# Patient Record
(demographics unavailable — no encounter records)

---

## 2024-11-16 NOTE — PHYSICAL EXAM

## 2024-11-16 NOTE — DEVELOPMENTAL MILESTONES
[Normal Development] : Normal Development [None] : none [Plays alongside other children] : plays alongside other children [Takes off some clothing] : takes off some clothing [Scoops well with spoon] : scoops well with spoon [Uses 50 words] : uses 50 words [Combine 2 words into phrase or] : combines 2 words into phrase or sentences [Follows 2-step command] : follows 2-step command [Uses words that are 50% intelligible] : uses words that are 50% intelligible to strangers [Kicks ball] : kicks ball  [Runs with coordination] : runs with coordination [Climbs up a ladder at a] : climbs up a ladder at a playground [Stacks objects] : stacks objects [Turns book pages] : turns book pages [Passed] : passed [Yes] : Completed.

## 2024-11-16 NOTE — HISTORY OF PRESENT ILLNESS
[Parents] : parents [Vegetables] : vegetables [Cow's milk (Ounces per day ___)] : consumes [unfilled] oz of Cow's milk per day [Fruit] : fruit [Meat] : meat [Cereal] : cereal [Eggs] : eggs [Finger Foods] : finger foods [Table food] : table food [Dairy] : dairy [___ stools per day] : [unfilled]  stools per day [Firm] : stools are firm consistency [Normal] : Normal [In bed] : In bed [Brushing teeth] : Brushing teeth [Sippy cup use] : Sippy cup use [Yes] : Patient goes to dentist yearly [Toothpaste] : Primary Fluoride Source: Toothpaste [In nursery school] : In nursery school [Playtime 60 min a day] : Playtime 60 min a day [Toilet Training] : Toilet training [<2 hrs of screen time] : Less than 2 hrs of screen time [No] : Not at  exposure [Water heater temperature set at <120 degrees F] : Water heater temperature set at <120 degrees F [Car seat in back seat] : Car seat in back seat [Carbon Monoxide Detectors] : Carbon monoxide detectors [Exposure to electronic nicotine delivery system] : No exposure to electronic nicotine delivery system [At risk for exposure to TB] : Not at risk for exposure to Tuberculosis [Up to date] : Up to date [FreeTextEntry7] : 2 year old for her check up [de-identified] : doing very well  [NO] : No

## 2024-11-16 NOTE — PHYSICAL EXAM

## 2024-11-16 NOTE — DISCUSSION/SUMMARY
[Normal Growth] : growth [Normal Development] : development [None] : No known medical problems [No Elimination Concerns] : elimination [No Feeding Concerns] : feeding [No Skin Concerns] : skin [Normal Sleep Pattern] : sleep [Assessment of Language Development] : assessment of language development [Temperament and Behavior] : temperament and behavior [Toilet Training] : toilet training [TV Viewing] : tv viewing [Safety] : safety [No Medications] : ~He/She~ is not on any medications [Parent/Guardian] : parent/guardian [] : The components of the vaccine(s) to be administered today are listed in the plan of care. The disease(s) for which the vaccine(s) are intended to prevent and the risks have been discussed with the caretaker.  The risks are also included in the appropriate vaccination information statements which have been provided to the patient's caregiver.  The caregiver has given consent to vaccinate. [FreeTextEntry1] : Continue cow's milk. Continue table foods, 3 meals with 2-3 snacks per day. Incorporate flourinated water daily in a sippy cup. Brush teeth twice a day with soft toothbrush. Recommend visit to dentist. As per seat 's guidelines, use foward-facing car seat in back seat of car. Put toddler to sleep in own bed. Help toddler to maintain consistent daily routines and sleep schedule. Toilet training discussed. Ensure home is safe. Use consistent, positive discipline. Read aloud to toddler. Limit screen time to no more than 2 hours per day. follow up in 6 months

## 2024-11-16 NOTE — HISTORY OF PRESENT ILLNESS
[Parents] : parents [Vegetables] : vegetables [Cow's milk (Ounces per day ___)] : consumes [unfilled] oz of Cow's milk per day [Fruit] : fruit [Meat] : meat [Cereal] : cereal [Eggs] : eggs [Finger Foods] : finger foods [Table food] : table food [Dairy] : dairy [___ stools per day] : [unfilled]  stools per day [Firm] : stools are firm consistency [Normal] : Normal [In bed] : In bed [Brushing teeth] : Brushing teeth [Sippy cup use] : Sippy cup use [Yes] : Patient goes to dentist yearly [Toothpaste] : Primary Fluoride Source: Toothpaste [In nursery school] : In nursery school [Playtime 60 min a day] : Playtime 60 min a day [Toilet Training] : Toilet training [<2 hrs of screen time] : Less than 2 hrs of screen time [No] : Not at  exposure [Water heater temperature set at <120 degrees F] : Water heater temperature set at <120 degrees F [Car seat in back seat] : Car seat in back seat [Carbon Monoxide Detectors] : Carbon monoxide detectors [Exposure to electronic nicotine delivery system] : No exposure to electronic nicotine delivery system [At risk for exposure to TB] : Not at risk for exposure to Tuberculosis [Up to date] : Up to date [FreeTextEntry7] : 2 year old for her check up [de-identified] : doing very well  [NO] : No

## 2025-05-08 NOTE — PHYSICAL EXAM

## 2025-05-08 NOTE — HISTORY OF PRESENT ILLNESS
[Father] : father [Fruit] : fruit [Vegetables] : vegetables [Meat] : meat [Dairy] : dairy [___ stools per day] : [unfilled]  stools per day [Firm] : stools are firm consistency [Normal] : Normal [In bed] : In bed [Sippy cup use] : Sippy cup use [Brushing teeth] : Brushing teeth [Toothpaste] : Primary Fluoride Source: Toothpaste [In nursery school] : In nursery school [Playtime (60 min/d)] : Playtime 60 min a day [< 2 hrs of screen time] : Less than 2 hrs of screen time [No] : Not at  exposure [whole ___ oz/d] : consumes [unfilled] oz of whole milk per day [Grains] : grains [Eggs] : eggs [Fish] : fish [Water heater temperature set at <120 degrees F] : Water heater temperature set at <120 degrees F [Car seat in back seat] : Car seat in back seat [Carbon Monoxide Detectors] : Carbon monoxide detectors [Smoke Detectors] : Smoke detectors [Exposure to electronic nicotine delivery system] : No exposure to electronic nicotine delivery system [Supervised play near cars and streets] : Supervised play near cars and streets [Up to date] : Up to date [FreeTextEntry7] : 30 month old for her check up [de-identified] : doing very well in nursery school full time [de-identified] : had peanut product first time and got itchy tongue and hives on her wrists.  [NO] : No

## 2025-05-08 NOTE — DISCUSSION/SUMMARY
[Normal Growth] : growth [Normal Development] : development [None] : No known medical problems [No Elimination Concerns] : elimination [No Feeding Concerns] : feeding [No Skin Concerns] : skin [Normal Sleep Pattern] : sleep [Family Routines] : family routines [Language Promotion and Communication] : language promotion and communication [Social Development] : social development [ Considerations] :  considerations [Safety] : safety [No Medications] : ~He/She~ is not on any medications [Parent/Guardian] : parent/guardian [FreeTextEntry1] : Continue cow's milk. Continue table foods, 3 meals with 2-3 snacks per day. Incorporate flourinated water daily in a sippy cup. Brush teeth twice a day with soft toothbrush. Recommend visit to dentist. As per seat 's guidelines, use foward-facing car seat in back seat of car. Put toddler to sleep in own bed. Help toddler to maintain consistent daily routines and sleep schedule. Toilet training discussed. Ensure home is safe. Use consistent, positive discipline. Read aloud to toddler. Limit screen time to no more than 2 hours per day. avoid peanut products for now, obtain cbc and lead with food allergy panel.  once we have the food allergies we can write her school form for next year.